# Patient Record
Sex: FEMALE | Race: WHITE | NOT HISPANIC OR LATINO | Employment: OTHER | URBAN - NONMETROPOLITAN AREA
[De-identification: names, ages, dates, MRNs, and addresses within clinical notes are randomized per-mention and may not be internally consistent; named-entity substitution may affect disease eponyms.]

---

## 2020-12-13 ENCOUNTER — HOSPITAL ENCOUNTER (OUTPATIENT)
Facility: HOSPITAL | Age: 60
Setting detail: OBSERVATION
Discharge: HOME | End: 2020-12-14
Attending: EMERGENCY MEDICINE | Admitting: INTERNAL MEDICINE

## 2020-12-13 ENCOUNTER — APPOINTMENT (OUTPATIENT)
Dept: CT IMAGING | Facility: HOSPITAL | Age: 60
End: 2020-12-13

## 2020-12-13 DIAGNOSIS — N39.0 ACUTE UTI: ICD-10-CM

## 2020-12-13 DIAGNOSIS — N20.1 LEFT URETERAL STONE: Primary | ICD-10-CM

## 2020-12-13 LAB
ALBUMIN SERPL-MCNC: 3.9 G/DL (ref 3.5–5.2)
ALBUMIN/GLOB SERPL: 1.1 G/DL
ALP SERPL-CCNC: 112 U/L (ref 39–117)
ALT SERPL W P-5'-P-CCNC: 23 U/L (ref 1–33)
ANION GAP SERPL CALCULATED.3IONS-SCNC: 11 MMOL/L (ref 5–15)
AST SERPL-CCNC: 27 U/L (ref 1–32)
BACTERIA UR QL AUTO: ABNORMAL /HPF
BASOPHILS # BLD AUTO: 0.09 10*3/MM3 (ref 0–0.2)
BASOPHILS NFR BLD AUTO: 0.8 % (ref 0–1.5)
BILIRUB SERPL-MCNC: 0.2 MG/DL (ref 0–1.2)
BILIRUB UR QL STRIP: NEGATIVE
BUN SERPL-MCNC: 9 MG/DL (ref 6–20)
BUN/CREAT SERPL: 10.1 (ref 7–25)
CALCIUM SPEC-SCNC: 9.1 MG/DL (ref 8.6–10.5)
CHLORIDE SERPL-SCNC: 107 MMOL/L (ref 98–107)
CLARITY UR: ABNORMAL
CO2 SERPL-SCNC: 22 MMOL/L (ref 22–29)
COLOR UR: YELLOW
CREAT SERPL-MCNC: 0.89 MG/DL (ref 0.57–1)
DEPRECATED RDW RBC AUTO: 42.1 FL (ref 37–54)
EOSINOPHIL # BLD AUTO: 0.27 10*3/MM3 (ref 0–0.4)
EOSINOPHIL NFR BLD AUTO: 2.3 % (ref 0.3–6.2)
ERYTHROCYTE [DISTWIDTH] IN BLOOD BY AUTOMATED COUNT: 13.6 % (ref 12.3–15.4)
GFR SERPL CREATININE-BSD FRML MDRD: 65 ML/MIN/1.73
GLOBULIN UR ELPH-MCNC: 3.4 GM/DL
GLUCOSE SERPL-MCNC: 128 MG/DL (ref 65–99)
GLUCOSE UR STRIP-MCNC: NEGATIVE MG/DL
HCT VFR BLD AUTO: 39.2 % (ref 34–46.6)
HGB BLD-MCNC: 13.4 G/DL (ref 12–15.9)
HGB UR QL STRIP.AUTO: ABNORMAL
HOLD SPECIMEN: NORMAL
HOLD SPECIMEN: NORMAL
HYALINE CASTS UR QL AUTO: ABNORMAL /LPF
IMM GRANULOCYTES # BLD AUTO: 0.08 10*3/MM3 (ref 0–0.05)
IMM GRANULOCYTES NFR BLD AUTO: 0.7 % (ref 0–0.5)
KETONES UR QL STRIP: NEGATIVE
LEUKOCYTE ESTERASE UR QL STRIP.AUTO: ABNORMAL
LIPASE SERPL-CCNC: 74 U/L (ref 13–60)
LYMPHOCYTES # BLD AUTO: 3.98 10*3/MM3 (ref 0.7–3.1)
LYMPHOCYTES NFR BLD AUTO: 33.5 % (ref 19.6–45.3)
MCH RBC QN AUTO: 28.9 PG (ref 26.6–33)
MCHC RBC AUTO-ENTMCNC: 34.2 G/DL (ref 31.5–35.7)
MCV RBC AUTO: 84.7 FL (ref 79–97)
MONOCYTES # BLD AUTO: 0.81 10*3/MM3 (ref 0.1–0.9)
MONOCYTES NFR BLD AUTO: 6.8 % (ref 5–12)
NEUTROPHILS NFR BLD AUTO: 55.9 % (ref 42.7–76)
NEUTROPHILS NFR BLD AUTO: 6.64 10*3/MM3 (ref 1.7–7)
NITRITE UR QL STRIP: NEGATIVE
NRBC BLD AUTO-RTO: 0 /100 WBC (ref 0–0.2)
PH UR STRIP.AUTO: 5.5 [PH] (ref 5–9)
PLATELET # BLD AUTO: 324 10*3/MM3 (ref 140–450)
PMV BLD AUTO: 10.5 FL (ref 6–12)
POTASSIUM SERPL-SCNC: 3.4 MMOL/L (ref 3.5–5.2)
PROT SERPL-MCNC: 7.3 G/DL (ref 6–8.5)
PROT UR QL STRIP: NEGATIVE
RBC # BLD AUTO: 4.63 10*6/MM3 (ref 3.77–5.28)
RBC # UR: ABNORMAL /HPF
REF LAB TEST METHOD: ABNORMAL
SODIUM SERPL-SCNC: 140 MMOL/L (ref 136–145)
SP GR UR STRIP: 1.02 (ref 1–1.03)
SQUAMOUS #/AREA URNS HPF: ABNORMAL /HPF
UROBILINOGEN UR QL STRIP: ABNORMAL
WBC # BLD AUTO: 11.87 10*3/MM3 (ref 3.4–10.8)
WBC UR QL AUTO: ABNORMAL /HPF
WHOLE BLOOD HOLD SPECIMEN: NORMAL
WHOLE BLOOD HOLD SPECIMEN: NORMAL

## 2020-12-13 PROCEDURE — 99284 EMERGENCY DEPT VISIT MOD MDM: CPT

## 2020-12-13 PROCEDURE — 25010000002 KETOROLAC TROMETHAMINE PER 15 MG: Performed by: EMERGENCY MEDICINE

## 2020-12-13 PROCEDURE — 96375 TX/PRO/DX INJ NEW DRUG ADDON: CPT

## 2020-12-13 PROCEDURE — 80053 COMPREHEN METABOLIC PANEL: CPT | Performed by: EMERGENCY MEDICINE

## 2020-12-13 PROCEDURE — 81001 URINALYSIS AUTO W/SCOPE: CPT | Performed by: EMERGENCY MEDICINE

## 2020-12-13 PROCEDURE — 85025 COMPLETE CBC W/AUTO DIFF WBC: CPT | Performed by: EMERGENCY MEDICINE

## 2020-12-13 PROCEDURE — 25010000002 ONDANSETRON PER 1 MG: Performed by: EMERGENCY MEDICINE

## 2020-12-13 PROCEDURE — 74176 CT ABD & PELVIS W/O CONTRAST: CPT

## 2020-12-13 PROCEDURE — 25010000002 LEVOFLOXACIN PER 250 MG: Performed by: EMERGENCY MEDICINE

## 2020-12-13 PROCEDURE — 96365 THER/PROPH/DIAG IV INF INIT: CPT

## 2020-12-13 PROCEDURE — 83690 ASSAY OF LIPASE: CPT | Performed by: EMERGENCY MEDICINE

## 2020-12-13 PROCEDURE — 99285 EMERGENCY DEPT VISIT HI MDM: CPT

## 2020-12-13 RX ORDER — ONDANSETRON 2 MG/ML
4 INJECTION INTRAMUSCULAR; INTRAVENOUS ONCE
Status: COMPLETED | OUTPATIENT
Start: 2020-12-13 | End: 2020-12-13

## 2020-12-13 RX ORDER — FAMOTIDINE 20 MG/1
20 TABLET, FILM COATED ORAL ONCE
Status: COMPLETED | OUTPATIENT
Start: 2020-12-13 | End: 2020-12-14

## 2020-12-13 RX ORDER — LEVOFLOXACIN 5 MG/ML
500 INJECTION, SOLUTION INTRAVENOUS ONCE
Status: COMPLETED | OUTPATIENT
Start: 2020-12-13 | End: 2020-12-14

## 2020-12-13 RX ORDER — FAMOTIDINE 20 MG/1
20 TABLET, FILM COATED ORAL NIGHTLY
COMMUNITY
End: 2022-05-05

## 2020-12-13 RX ORDER — SERTRALINE HYDROCHLORIDE 100 MG/1
100 TABLET, FILM COATED ORAL NIGHTLY
COMMUNITY
Start: 2020-11-04 | End: 2021-02-03

## 2020-12-13 RX ORDER — KETOROLAC TROMETHAMINE 15 MG/ML
15 INJECTION, SOLUTION INTRAMUSCULAR; INTRAVENOUS ONCE
Status: COMPLETED | OUTPATIENT
Start: 2020-12-13 | End: 2020-12-13

## 2020-12-13 RX ORDER — OMEPRAZOLE 40 MG/1
40 CAPSULE, DELAYED RELEASE ORAL DAILY
COMMUNITY

## 2020-12-13 RX ORDER — POTASSIUM CHLORIDE 750 MG/1
20 CAPSULE, EXTENDED RELEASE ORAL ONCE
Status: COMPLETED | OUTPATIENT
Start: 2020-12-13 | End: 2020-12-13

## 2020-12-13 RX ORDER — SODIUM CHLORIDE 0.9 % (FLUSH) 0.9 %
10 SYRINGE (ML) INJECTION AS NEEDED
Status: DISCONTINUED | OUTPATIENT
Start: 2020-12-13 | End: 2020-12-14 | Stop reason: HOSPADM

## 2020-12-13 RX ADMIN — ONDANSETRON 4 MG: 2 INJECTION INTRAMUSCULAR; INTRAVENOUS at 22:42

## 2020-12-13 RX ADMIN — SODIUM CHLORIDE 1000 ML: 9 INJECTION, SOLUTION INTRAVENOUS at 22:42

## 2020-12-13 RX ADMIN — LEVOFLOXACIN 500 MG: 5 INJECTION, SOLUTION INTRAVENOUS at 23:50

## 2020-12-13 RX ADMIN — POTASSIUM CHLORIDE 20 MEQ: 750 CAPSULE, EXTENDED RELEASE ORAL at 23:49

## 2020-12-13 RX ADMIN — KETOROLAC TROMETHAMINE 15 MG: 15 INJECTION, SOLUTION INTRAMUSCULAR; INTRAVENOUS at 22:42

## 2020-12-14 VITALS
DIASTOLIC BLOOD PRESSURE: 79 MMHG | HEIGHT: 61 IN | TEMPERATURE: 98.2 F | SYSTOLIC BLOOD PRESSURE: 168 MMHG | RESPIRATION RATE: 20 BRPM | WEIGHT: 155 LBS | HEART RATE: 71 BPM | BODY MASS INDEX: 29.27 KG/M2 | OXYGEN SATURATION: 98 %

## 2020-12-14 PROBLEM — A49.9 UTI (URINARY TRACT INFECTION), BACTERIAL: Status: ACTIVE | Noted: 2020-12-14

## 2020-12-14 PROBLEM — N39.0 UTI (URINARY TRACT INFECTION), BACTERIAL: Status: ACTIVE | Noted: 2020-12-14

## 2020-12-14 LAB
FLUAV RNA RESP QL NAA+PROBE: NOT DETECTED
FLUBV RNA RESP QL NAA+PROBE: NOT DETECTED
SARS-COV-2 RNA RESP QL NAA+PROBE: NOT DETECTED

## 2020-12-14 PROCEDURE — G0378 HOSPITAL OBSERVATION PER HR: HCPCS

## 2020-12-14 PROCEDURE — 82365 CALCULUS SPECTROSCOPY: CPT | Performed by: NURSE PRACTITIONER

## 2020-12-14 PROCEDURE — 25010000002 MORPHINE PER 10 MG: Performed by: HOSPITALIST

## 2020-12-14 PROCEDURE — 25010000002 ONDANSETRON PER 1 MG: Performed by: HOSPITALIST

## 2020-12-14 PROCEDURE — 96376 TX/PRO/DX INJ SAME DRUG ADON: CPT

## 2020-12-14 PROCEDURE — 96366 THER/PROPH/DIAG IV INF ADDON: CPT

## 2020-12-14 PROCEDURE — 96375 TX/PRO/DX INJ NEW DRUG ADDON: CPT

## 2020-12-14 PROCEDURE — 96361 HYDRATE IV INFUSION ADD-ON: CPT

## 2020-12-14 PROCEDURE — 87636 SARSCOV2 & INF A&B AMP PRB: CPT | Performed by: EMERGENCY MEDICINE

## 2020-12-14 PROCEDURE — 25010000002 KETOROLAC TROMETHAMINE PER 15 MG: Performed by: NURSE PRACTITIONER

## 2020-12-14 RX ORDER — POTASSIUM CHLORIDE 750 MG/1
40 CAPSULE, EXTENDED RELEASE ORAL AS NEEDED
Status: DISCONTINUED | OUTPATIENT
Start: 2020-12-14 | End: 2020-12-14 | Stop reason: HOSPADM

## 2020-12-14 RX ORDER — LEVOFLOXACIN 5 MG/ML
500 INJECTION, SOLUTION INTRAVENOUS EVERY 24 HOURS
Status: DISCONTINUED | OUTPATIENT
Start: 2020-12-15 | End: 2020-12-14 | Stop reason: HOSPADM

## 2020-12-14 RX ORDER — TAMSULOSIN HYDROCHLORIDE 0.4 MG/1
0.4 CAPSULE ORAL DAILY
Status: DISCONTINUED | OUTPATIENT
Start: 2020-12-14 | End: 2020-12-14 | Stop reason: HOSPADM

## 2020-12-14 RX ORDER — LANOLIN ALCOHOL/MO/W.PET/CERES
5.25 CREAM (GRAM) TOPICAL NIGHTLY PRN
Status: DISCONTINUED | OUTPATIENT
Start: 2020-12-14 | End: 2020-12-14 | Stop reason: HOSPADM

## 2020-12-14 RX ORDER — MORPHINE SULFATE 2 MG/ML
1 INJECTION, SOLUTION INTRAMUSCULAR; INTRAVENOUS EVERY 4 HOURS PRN
Status: DISCONTINUED | OUTPATIENT
Start: 2020-12-14 | End: 2020-12-14

## 2020-12-14 RX ORDER — KETOROLAC TROMETHAMINE 30 MG/ML
30 INJECTION, SOLUTION INTRAMUSCULAR; INTRAVENOUS ONCE
Status: COMPLETED | OUTPATIENT
Start: 2020-12-14 | End: 2020-12-14

## 2020-12-14 RX ORDER — SODIUM CHLORIDE 0.9 % (FLUSH) 0.9 %
10 SYRINGE (ML) INJECTION EVERY 12 HOURS SCHEDULED
Status: DISCONTINUED | OUTPATIENT
Start: 2020-12-14 | End: 2020-12-14 | Stop reason: HOSPADM

## 2020-12-14 RX ORDER — ONDANSETRON 2 MG/ML
4 INJECTION INTRAMUSCULAR; INTRAVENOUS EVERY 6 HOURS PRN
Status: DISCONTINUED | OUTPATIENT
Start: 2020-12-14 | End: 2020-12-14 | Stop reason: HOSPADM

## 2020-12-14 RX ORDER — POTASSIUM CHLORIDE 1.5 G/1.77G
40 POWDER, FOR SOLUTION ORAL AS NEEDED
Status: DISCONTINUED | OUTPATIENT
Start: 2020-12-14 | End: 2020-12-14 | Stop reason: HOSPADM

## 2020-12-14 RX ORDER — LEVOFLOXACIN 500 MG/1
500 TABLET, FILM COATED ORAL DAILY
Qty: 2 TABLET | Refills: 0 | Status: SHIPPED | OUTPATIENT
Start: 2020-12-14 | End: 2022-05-05

## 2020-12-14 RX ORDER — SODIUM CHLORIDE 9 MG/ML
150 INJECTION, SOLUTION INTRAVENOUS CONTINUOUS
Status: DISCONTINUED | OUTPATIENT
Start: 2020-12-14 | End: 2020-12-14 | Stop reason: HOSPADM

## 2020-12-14 RX ORDER — ONDANSETRON 4 MG/1
4 TABLET, FILM COATED ORAL EVERY 6 HOURS PRN
Status: DISCONTINUED | OUTPATIENT
Start: 2020-12-14 | End: 2020-12-14 | Stop reason: HOSPADM

## 2020-12-14 RX ORDER — SODIUM CHLORIDE 0.9 % (FLUSH) 0.9 %
10 SYRINGE (ML) INJECTION AS NEEDED
Status: DISCONTINUED | OUTPATIENT
Start: 2020-12-14 | End: 2020-12-14 | Stop reason: HOSPADM

## 2020-12-14 RX ORDER — BISACODYL 5 MG/1
5 TABLET, DELAYED RELEASE ORAL DAILY PRN
Status: DISCONTINUED | OUTPATIENT
Start: 2020-12-14 | End: 2020-12-14 | Stop reason: HOSPADM

## 2020-12-14 RX ORDER — NALOXONE HCL 0.4 MG/ML
0.4 VIAL (ML) INJECTION
Status: DISCONTINUED | OUTPATIENT
Start: 2020-12-14 | End: 2020-12-14 | Stop reason: HOSPADM

## 2020-12-14 RX ORDER — MORPHINE SULFATE 2 MG/ML
2 INJECTION, SOLUTION INTRAMUSCULAR; INTRAVENOUS EVERY 4 HOURS PRN
Status: DISCONTINUED | OUTPATIENT
Start: 2020-12-14 | End: 2020-12-14 | Stop reason: HOSPADM

## 2020-12-14 RX ORDER — POTASSIUM CHLORIDE 7.45 MG/ML
10 INJECTION INTRAVENOUS
Status: DISCONTINUED | OUTPATIENT
Start: 2020-12-14 | End: 2020-12-14 | Stop reason: HOSPADM

## 2020-12-14 RX ORDER — NALOXONE HCL 0.4 MG/ML
0.4 VIAL (ML) INJECTION
Status: DISCONTINUED | OUTPATIENT
Start: 2020-12-14 | End: 2020-12-14

## 2020-12-14 RX ADMIN — SERTRALINE HYDROCHLORIDE 100 MG: 50 TABLET ORAL at 00:09

## 2020-12-14 RX ADMIN — TAMSULOSIN HYDROCHLORIDE 0.4 MG: 0.4 CAPSULE ORAL at 08:53

## 2020-12-14 RX ADMIN — KETOROLAC TROMETHAMINE 30 MG: 30 INJECTION, SOLUTION INTRAMUSCULAR; INTRAVENOUS at 08:53

## 2020-12-14 RX ADMIN — ONDANSETRON HYDROCHLORIDE 4 MG: 2 INJECTION, SOLUTION INTRAMUSCULAR; INTRAVENOUS at 06:53

## 2020-12-14 RX ADMIN — SODIUM CHLORIDE, PRESERVATIVE FREE 10 ML: 5 INJECTION INTRAVENOUS at 03:41

## 2020-12-14 RX ADMIN — FAMOTIDINE 20 MG: 20 TABLET ORAL at 00:08

## 2020-12-14 RX ADMIN — SODIUM CHLORIDE 150 ML/HR: 900 INJECTION, SOLUTION INTRAVENOUS at 04:18

## 2020-12-14 RX ADMIN — MORPHINE SULFATE 1 MG: 2 INJECTION, SOLUTION INTRAMUSCULAR; INTRAVENOUS at 06:04

## 2020-12-14 RX ADMIN — SODIUM CHLORIDE, PRESERVATIVE FREE 10 ML: 5 INJECTION INTRAVENOUS at 08:06

## 2020-12-14 NOTE — ED PROVIDER NOTES
Subjective   60-year-old white female presents emerged part chief complaint of flank pain.  Patient complains of left flank pain radiating to the left lower quadrant that started this evening.  Patient describes the pain as a sharp throbbing pain.  Patient rates the pain as 10 out of 10 severity.  Associated symptoms include nausea and vomiting.  Patient denies fever, sweats, chills, chest pain, shortness of breath, dysuria, or hematuria.          Review of Systems   Constitutional: Negative for chills, diaphoresis and fever.   Respiratory: Negative for cough and shortness of breath.    Cardiovascular: Negative for chest pain and leg swelling.   Gastrointestinal: Positive for abdominal pain, nausea and vomiting. Negative for blood in stool.   Genitourinary: Positive for flank pain. Negative for dysuria and hematuria.   Musculoskeletal: Positive for back pain. Negative for gait problem and neck pain.   Neurological: Negative for syncope, weakness and headaches.   All other systems reviewed and are negative.      History reviewed. No pertinent past medical history.    Allergies   Allergen Reactions   • Keflex [Cephalexin] Diarrhea       History reviewed. No pertinent surgical history.    No family history on file.    Social History     Socioeconomic History   • Marital status:      Spouse name: Not on file   • Number of children: Not on file   • Years of education: Not on file   • Highest education level: Not on file           Objective   Physical Exam  Vitals signs and nursing note reviewed.   Constitutional:       General: She is not in acute distress.     Appearance: She is not toxic-appearing or diaphoretic.   HENT:      Head: Normocephalic and atraumatic.      Right Ear: External ear normal.      Left Ear: External ear normal.      Nose: Nose normal.      Mouth/Throat:      Mouth: Mucous membranes are moist.      Pharynx: Oropharynx is clear.   Eyes:      Extraocular Movements: Extraocular movements  intact.      Conjunctiva/sclera: Conjunctivae normal.      Pupils: Pupils are equal, round, and reactive to light.   Neck:      Musculoskeletal: Normal range of motion and neck supple.   Cardiovascular:      Rate and Rhythm: Normal rate and regular rhythm.      Pulses: Normal pulses.      Heart sounds: Normal heart sounds.   Pulmonary:      Effort: Pulmonary effort is normal.      Breath sounds: Normal breath sounds.   Abdominal:      General: Bowel sounds are normal. There is no distension.      Palpations: Abdomen is soft. There is no mass.      Tenderness: There is no abdominal tenderness. There is no guarding.   Musculoskeletal: Normal range of motion.      Right lower leg: No edema.      Left lower leg: No edema.   Skin:     General: Skin is warm and dry.   Neurological:      General: No focal deficit present.      Mental Status: She is alert and oriented to person, place, and time.   Psychiatric:         Mood and Affect: Mood normal.         Behavior: Behavior normal.         Procedures           ED Course  ED Course as of Dec 14 0055   Sun Dec 13, 2020   2340 Patient has a left ureteral stone with hydronephrosis and UTI.  She will need to be admitted for IV antibiotics and urology consultation in the morning.  Patient is alert and resting comfortably.  I reviewed the results of her evaluation with her and my recommendation for admission.    [DR]   2352 Case discussed with the hospitalist Dr. Castillo.  She agrees to admit the patient to the medical floor.    [DR]      ED Course User Index  [DR] Freddy Acosta MD            Labs Reviewed   COMPREHENSIVE METABOLIC PANEL - Abnormal; Notable for the following components:       Result Value    Glucose 128 (*)     Potassium 3.4 (*)     All other components within normal limits    Narrative:     GFR Normal >60  Chronic Kidney Disease <60  Kidney Failure <15     LIPASE - Abnormal; Notable for the following components:    Lipase 74 (*)     All other components within  normal limits   URINALYSIS W/ MICROSCOPIC IF INDICATED (NO CULTURE) - Abnormal; Notable for the following components:    Appearance, UA Cloudy (*)     Blood, UA Large (3+) (*)     Leuk Esterase, UA Moderate (2+) (*)     All other components within normal limits   CBC WITH AUTO DIFFERENTIAL - Abnormal; Notable for the following components:    WBC 11.87 (*)     Immature Grans % 0.7 (*)     Lymphocytes, Absolute 3.98 (*)     Immature Grans, Absolute 0.08 (*)     All other components within normal limits   URINALYSIS, MICROSCOPIC ONLY - Abnormal; Notable for the following components:    RBC, UA Too Numerous to Count (*)     WBC, UA 13-20 (*)     All other components within normal limits   COVID-19 AND FLU A/B, NP SWAB IN TRANSPORT MEDIA 8-12 HR TAT   RAINBOW DRAW    Narrative:     The following orders were created for panel order [place] Draw.  Procedure                               Abnormality         Status                     ---------                               -----------         ------                     Light Blue Top[[id number]]                                    Final result               Green Top (Gel)[[id number]]                                   Final result               Lavender Top[[id number]]                                      Final result               Gold Top - SST[[id number]]                                    Final result                 Please view results for these tests on the individual orders.   LIGHT BLUE TOP   GREEN TOP   LAVENDER TOP   GOLD TOP - SST   CBC AND DIFFERENTIAL    Narrative:     The following orders were created for panel order CBC & Differential.  Procedure                               Abnormality         Status                     ---------                               -----------         ------                     CBC Auto Differential[[id number]]         Abnormal            Final result                 Please view results for these tests on the individual orders.     Ct  Abdomen Pelvis Without Contrast    Result Date: 12/13/2020  Narrative: EXAM DESCRIPTION: CT ABDOMEN PELVIS WO CONTRAST RadLex: CT ABDOMEN PELVIS WITHOUT IV CONTRAST CLINICAL HISTORY: 60 years  Female;  left flank pain TECHNOLOGIST NOTES: TECHNIQUE: Helical CT imaging was obtained of the abdomen and pelvis with multiplanar reconstructions. This exam was performed according to our departmental dose-optimization program, which includes automated exposure control, adjustment of the mA and/or kV according to patient size and/or use of iterative reconstruction techniques. COMPARISON: None currently available FINDINGS: Abdomen: The visualized lung bases are unremarkable. There is no focal consolidation.  There is a small to moderate hiatal hernia.  The liver, spleen, pancreas, adrenal glands and kidneys show no acute abnormality. No evidence of acute pancreatitis.    There are no calcified gallstones. There is no gallbladder wall thickening. No pericholecystic fluid.  There is no gross biliary duct dilatation.  There is mild left hydronephrosis. There are no renal stones.   No free air.    There is no significant free fluid.    There is no significant lymph node enlargement. There is no significant aneurysmal enlargement of the abdominal aorta. Pelvis: There is no evidence of bowel obstruction.    No herniated bowel loops.  . No focal inflammatory changes . The appendix is unremarkable.  Evaluation of the bowel is limited when there is no oral contrast or when the bowel is incompletely opacified with enteric contrast.. There is no significant free fluid in the pelvis. The bladder is grossly unremarkable.  There is a left hip arthroplasty. There is associated artifact secondary to the hardware. There may be a 3 mm stone at the left UVJ but evaluation is very limited. Uterus appears to be absent.     Impression: 1.  Mild left hydronephrosis and hydroureter. Possible 3 mm stone at the left UVJ but evaluation is limited due to  artifact from the patient's left hip arthroplasty. 2.  Small to moderate hiatal hernia. Electronically signed by:  Floyd Gomes MD  12/13/2020 11:27 PM CST Workstation: [id number]                                    [facility]    Final diagnoses:   Left ureteral stone   Acute UTI            Freddy Acosta MD  12/14/20 0055

## 2020-12-14 NOTE — H&P
.        [facility] Medicine Admission      Date of Admission: 12/13/2020  Patient seen and evaluated on 12/13/2020 although note is written after midnight    Primary Care Physician: Jt Yuan MD      Chief Complaint: *Flank pain*    HPI:*  *Patient is a 60-year-old female who presents to the [facility] emergency room with complaints of flank pain.  She states that the pain started early evening the day of admission.  It was on the left side and radiated into her groin area.  She describes the pain as a sharp stabbing type pain.  When it happened it was a 10 out of 10 and continued on into the ER.  For which she was given pain medicine which did help.  She reports nausea and vomiting denies any diarrhea fever chills sick contacts headache blurry vision or chest pain.  He does not have a history of kidney stones.  Work-up in the emergency room showed a left 3 mm stone with hydronephrosis and labs consistent with urinary tract infection.    Concurrent Medical History:  has no past medical history on file.  Depression  Past Surgical History:  has no past surgical history on file.  None  Family History: family history is not on file. **Hypertension*    Social History:  Does not smoke concentric alcohol does not use any illicit drugs    Allergies:   Allergies   Allergen Reactions   • Keflex [Cephalexin] Diarrhea       Medications:   Prior to Admission medications    Medication Sig Start Date End Date Taking? Authorizing Provider   famotidine (PEPCID) 20 MG tablet Take 20 mg by mouth Every Night.   Yes Jae Stevenson MD   omeprazole (priLOSEC) 40 MG capsule Take 40 mg by mouth Daily.   Yes Jae Stevenson MD   sertraline (ZOLOFT) 100 MG tablet Take 100 mg by mouth Every Night. 11/4/20 2/3/21 Yes Jae Stevenson MD       Review of Systems:  Review of Systems   12 point review of systems obtained pertinent positives and negatives noted above in  HPI otherwise complete ROS is negative except as mentioned above.    Physical Exam:   Temp:  [97.6 °F (36.4 °C)] 97.6 °F (36.4 °C)  Heart Rate:  [77-88] 88  Resp:  [18] 18  BP: (138-153)/(73) 138/73  Physical Exam  Vitals signs and nursing note reviewed.   Constitutional:       General: She is not in acute distress.     Appearance: She is ill-appearing. She is not toxic-appearing or diaphoretic.   HENT:      Head: Normocephalic and atraumatic.      Nose: Nose normal.      Mouth/Throat:      Pharynx: Oropharynx is clear.   Eyes:      Extraocular Movements: Extraocular movements intact.      Conjunctiva/sclera: Conjunctivae normal.   Neck:      Musculoskeletal: Neck supple.   Cardiovascular:      Rate and Rhythm: Normal rate and regular rhythm.      Pulses: Normal pulses.      Heart sounds: Normal heart sounds.   Pulmonary:      Effort: Pulmonary effort is normal.      Breath sounds: Normal breath sounds.   Abdominal:      General: Bowel sounds are normal.      Palpations: Abdomen is soft.      Tenderness: There is abdominal tenderness. There is left CVA tenderness. There is no guarding or rebound.   Musculoskeletal:         General: No swelling or deformity.   Skin:     General: Skin is warm and dry.      Capillary Refill: Capillary refill takes less than 2 seconds.   Neurological:      General: No focal deficit present.      Mental Status: She is alert and oriented to person, place, and time.      Cranial Nerves: No cranial nerve deficit.   Psychiatric:         Mood and Affect: Mood normal.         Behavior: Behavior normal.           Results Reviewed:  I have personally reviewed current lab, radiology, and data and agree with results.  Lab Results (last 24 hours)     Procedure Component Value Units Date/Time    COVID-19 and FLU A/B PCR - Swab, Nasopharynx [[id number]] Collected: 12/14/20 0001    Specimen: Swab from Nasopharynx Updated: 12/14/20 0010    [place] Draw [[id number]] Collected: 12/13/20 2214    Specimen:  Blood Updated: 12/13/20 2316    Narrative:      The following orders were created for panel order [place] Draw.  Procedure                               Abnormality         Status                     ---------                               -----------         ------                     Light Blue Top[[id number]]                                    Final result               Green Top (Gel)[[id number]]                                   Final result               Lavender Top[[id number]]                                      Final result               Gold Top - SST[[id number]]                                    Final result                 Please view results for these tests on the individual orders.    Light Blue Top [[id number]] Collected: 12/13/20 2214    Specimen: Blood Updated: 12/13/20 2316     Extra Tube hold for add-on     Comment: Auto resulted       Green Top (Gel) [[id number]] Collected: 12/13/20 2214    Specimen: Blood Updated: 12/13/20 2316     Extra Tube Hold for add-ons.     Comment: Auto resulted.       Lavender Top [[id number]] Collected: 12/13/20 2214    Specimen: Blood Updated: 12/13/20 2316     Extra Tube hold for add-on     Comment: Auto resulted       Gold Top - SST [[id number]] Collected: 12/13/20 2214    Specimen: Blood Updated: 12/13/20 2316     Extra Tube Hold for add-ons.     Comment: Auto resulted.       Urinalysis With Microscopic If Indicated (No Culture) - Urine, Clean Catch [[id number]]  (Abnormal) Collected: 12/13/20 2304    Specimen: Urine, Clean Catch Updated: 12/13/20 2315     Color, UA Yellow     Appearance, UA Cloudy     pH, UA 5.5     Specific Gravity, UA 1.022     Glucose, UA Negative     Ketones, UA Negative     Bilirubin, UA Negative     Blood, UA Large (3+)     Protein, UA Negative     Leuk Esterase, UA Moderate (2+)     Nitrite, UA Negative     Urobilinogen, UA 0.2 E.U./dL    Urinalysis, Microscopic Only - Urine, Clean Catch [[id number]]  (Abnormal) Collected: 12/13/20 2304    Specimen: Urine,  Clean Catch Updated: 12/13/20 2315     RBC, UA Too Numerous to Count /HPF      WBC, UA 13-20 /HPF      Bacteria, UA None Seen /HPF      Squamous Epithelial Cells, UA None Seen /HPF      Hyaline Casts, UA 3-6 /LPF      Methodology Automated Microscopy    CBC & Differential [[id number]]  (Abnormal) Collected: 12/13/20 2214    Specimen: Blood Updated: 12/13/20 2249    Narrative:      The following orders were created for panel order CBC & Differential.  Procedure                               Abnormality         Status                     ---------                               -----------         ------                     CBC Auto Differential[[id number]]         Abnormal            Final result                 Please view results for these tests on the individual orders.    CBC Auto Differential [[id number]]  (Abnormal) Collected: 12/13/20 2214    Specimen: Blood Updated: 12/13/20 2249     WBC 11.87 10*3/mm3      RBC 4.63 10*6/mm3      Hemoglobin 13.4 g/dL      Hematocrit 39.2 %      MCV 84.7 fL      MCH 28.9 pg      MCHC 34.2 g/dL      RDW 13.6 %      RDW-SD 42.1 fl      MPV 10.5 fL      Platelets 324 10*3/mm3      Neutrophil % 55.9 %      Lymphocyte % 33.5 %      Monocyte % 6.8 %      Eosinophil % 2.3 %      Basophil % 0.8 %      Immature Grans % 0.7 %      Neutrophils, Absolute 6.64 10*3/mm3      Lymphocytes, Absolute 3.98 10*3/mm3      Monocytes, Absolute 0.81 10*3/mm3      Eosinophils, Absolute 0.27 10*3/mm3      Basophils, Absolute 0.09 10*3/mm3      Immature Grans, Absolute 0.08 10*3/mm3      nRBC 0.0 /100 WBC     Comprehensive Metabolic Panel [[id number]]  (Abnormal) Collected: 12/13/20 2214    Specimen: Blood Updated: 12/13/20 2243     Glucose 128 mg/dL      BUN 9 mg/dL      Creatinine 0.89 mg/dL      Sodium 140 mmol/L      Potassium 3.4 mmol/L      Chloride 107 mmol/L      CO2 22.0 mmol/L      Calcium 9.1 mg/dL      Total Protein 7.3 g/dL      Albumin 3.90 g/dL      ALT (SGPT) 23 U/L      AST (SGOT) 27 U/L       Alkaline Phosphatase 112 U/L      Total Bilirubin 0.2 mg/dL      eGFR Non African Amer 65 mL/min/1.73      Globulin 3.4 gm/dL      A/G Ratio 1.1 g/dL      BUN/Creatinine Ratio 10.1     Anion Gap 11.0 mmol/L     Narrative:      GFR Normal >60  Chronic Kidney Disease <60  Kidney Failure <15      Lipase [[id number]]  (Abnormal) Collected: 12/13/20 2214    Specimen: Blood Updated: 12/13/20 2243     Lipase 74 U/L         Imaging Results (Last 24 Hours)     Procedure Component Value Units Date/Time    CT Abdomen Pelvis Without Contrast [[id number]] Collected: 12/13/20 2250     Updated: 12/13/20 2328    Narrative:      EXAM DESCRIPTION:  CT ABDOMEN PELVIS WO CONTRAST  RadLex: CT ABDOMEN PELVIS WITHOUT IV CONTRAST     CLINICAL HISTORY:   60 years  Female;  left flank pain    TECHNOLOGIST NOTES:    TECHNIQUE: Helical CT imaging was obtained of the abdomen and  pelvis with multiplanar reconstructions. This exam was performed  according to our departmental dose-optimization program, which  includes automated exposure control, adjustment of the mA and/or  kV according to patient size and/or use of iterative  reconstruction techniques.     COMPARISON: None currently available    FINDINGS:   Abdomen:  The visualized lung bases are unremarkable. There is no focal  consolidation.  There is a small to moderate hiatal hernia.  The  liver, spleen, pancreas, adrenal glands and kidneys show no acute  abnormality. No evidence of acute pancreatitis.    There are no  calcified gallstones. There is no gallbladder wall thickening. No  pericholecystic fluid.  There is no gross biliary duct  dilatation.  There is mild left hydronephrosis. There are no  renal stones.      No free air.    There is no significant free fluid.    There is  no significant lymph node enlargement. There is no significant  aneurysmal enlargement of the abdominal aorta.    Pelvis:  There is no evidence of bowel obstruction.    No herniated bowel  loops.  . No focal  inflammatory changes . The appendix is  unremarkable.  Evaluation of the bowel is limited when there is  no oral contrast or when the bowel is incompletely opacified with  enteric contrast.. There is no significant free fluid in the  pelvis. The bladder is grossly unremarkable.  There is a left hip  arthroplasty. There is associated artifact secondary to the  hardware. There may be a 3 mm stone at the left UVJ but  evaluation is very limited. Uterus appears to be absent.          Impression:      1.  Mild left hydronephrosis and hydroureter. Possible 3 mm stone  at the left UVJ but evaluation is limited due to artifact from  the patient's left hip arthroplasty.  2.  Small to moderate hiatal hernia.    Electronically signed by:  Floyd Gomes MD  12/13/2020 11:27 PM CST  Workstation: [id number]            Assessment:    Active Hospital Problems    Diagnosis   • Left ureteral stone             Plan:    Nephrolithiasis with hydronephrosis-millimeter kidney stone with hydronephrosis on the left causing severe pain with urinary tract infection.  Patient has been started on Levaquin been given fluids will continue to fluids and attempt to possibly passed stone.  Will place on Dr. Fellow's list and will contact him in the a.m. if the stone has not passed.  N.p.o. after midnight.     Depression to new current medications    Hypokalemia will place on electrolyte protocol    DVT prophylaxis we will use SCDs as patient may be going to surgery    Preop Covid testing done in preparation for possible surgery in the a.m. if the stone does not pass    Full code    I discussed the patient's findings and my recommendations with: Patient and  at the bedside    Kenisha Veronica MD

## 2020-12-14 NOTE — DISCHARGE SUMMARY
[facility]  DISCHARGE SUMMARY       Date of Admission: 12/13/2020  Date of Discharge:  12/14/2020  Primary Care Physician: Jt Yuan MD    Presenting Problem/History of Present Illness:  Left ureteral stone [N20.1]       Final Discharge Diagnoses:    Left ureteral stone    UTI (urinary tract infection), bacterial      Consults:   Consults     Date and Time Order Name Status Description    12/14/2020 0102 Inpatient Urology Consult          Pertinent Test Results:   Ct Abdomen Pelvis Without Contrast    Result Date: 12/13/2020  EXAM DESCRIPTION: CT ABDOMEN PELVIS WO CONTRAST RadLex: CT ABDOMEN PELVIS WITHOUT IV CONTRAST CLINICAL HISTORY: 60 years  Female;  left flank pain TECHNOLOGIST NOTES: TECHNIQUE: Helical CT imaging was obtained of the abdomen and pelvis with multiplanar reconstructions. This exam was performed according to our departmental dose-optimization program, which includes automated exposure control, adjustment of the mA and/or kV according to patient size and/or use of iterative reconstruction techniques. COMPARISON: None currently available FINDINGS: Abdomen: The visualized lung bases are unremarkable. There is no focal consolidation.  There is a small to moderate hiatal hernia.  The liver, spleen, pancreas, adrenal glands and kidneys show no acute abnormality. No evidence of acute pancreatitis.    There are no calcified gallstones. There is no gallbladder wall thickening. No pericholecystic fluid.  There is no gross biliary duct dilatation.  There is mild left hydronephrosis. There are no renal stones.   No free air.    There is no significant free fluid.    There is no significant lymph node enlargement. There is no significant aneurysmal enlargement of the abdominal aorta. Pelvis: There is no evidence of bowel obstruction.    No herniated bowel loops.  . No focal inflammatory changes . The appendix is unremarkable.  Evaluation of the  "bowel is limited when there is no oral contrast or when the bowel is incompletely opacified with enteric contrast.. There is no significant free fluid in the pelvis. The bladder is grossly unremarkable.  There is a left hip arthroplasty. There is associated artifact secondary to the hardware. There may be a 3 mm stone at the left UVJ but evaluation is very limited. Uterus appears to be absent.     1.  Mild left hydronephrosis and hydroureter. Possible 3 mm stone at the left UVJ but evaluation is limited due to artifact from the patient's left hip arthroplasty. 2.  Small to moderate hiatal hernia. Electronically signed by:  Floyd Gomes MD  12/13/2020 11:27 PM CST Workstation: [id number]      HPI/Hospital Course:  The patient is a 60 y.o. female with a history of GERD who presented to [facility] with left flank pain and nausea.  She was found to have a 3 mm left UVJ stone.  She was managed conservatively with IV fluids, pain control, and antiemetics. Urine was strained and she passed her stone. It will be sent for stone analysis. UA is consistent with UTI and she will complete her course of PO levaquin.  She is stable and discharged to home.     Condition on Discharge:  Stable    Physical Exam on Discharge:  /79 (BP Location: Left arm, Patient Position: Sitting)   Pulse 71   Temp 98.2 °F (36.8 °C)   Resp 20   Ht 154.9 cm (61\")   Wt 70.3 kg (155 lb)   SpO2 98%   BMI 29.29 kg/m²   Physical Exam  Vitals signs reviewed.   Constitutional:       Appearance: Normal appearance.   HENT:      Head: Normocephalic and atraumatic.   Cardiovascular:      Rate and Rhythm: Normal rate and regular rhythm.   Pulmonary:      Effort: Pulmonary effort is normal. No respiratory distress.      Breath sounds: Normal breath sounds.   Abdominal:      General: Bowel sounds are normal. There is no distension.      Palpations: Abdomen is soft.      Tenderness: There is no abdominal tenderness.   Musculoskeletal:         " General: No swelling or deformity.   Skin:     General: Skin is warm and dry.   Neurological:      General: No focal deficit present.      Mental Status: She is alert and oriented to person, place, and time.           Discharge Disposition:  Home or Self Care    Discharge Medications:     Discharge Medications      New Medications      Instructions Start Date   levoFLOXacin 500 MG tablet  Commonly known as: Levaquin   500 mg, Oral, Daily         Continue These Medications      Instructions Start Date   famotidine 20 MG tablet  Commonly known as: PEPCID   20 mg, Oral, Nightly      omeprazole 40 MG capsule  Commonly known as: priLOSEC   40 mg, Oral, Daily      sertraline 100 MG tablet  Commonly known as: ZOLOFT   100 mg, Oral, Nightly             Discharge Diet:   Diet Instructions     Diet: Regular; Thin      Discharge Diet: Regular    Fluid Consistency: Thin          Activity at Discharge:   Activity Instructions     Activity as Tolerated            Follow-up Appointments:   1. PCP 1 week    Test Results Pending at Discharge:   Pending Labs     Order Current Status    CBC Auto Differential Collected (12/14/20 0546)    Comprehensive Metabolic Panel Collected (12/14/20 0546)    Magnesium Collected (12/14/20 0546)    Phosphorus Collected (12/14/20 0546)    Protime-INR Collected (12/14/20 0546)    aPTT Collected (12/14/20 0546)          ONESIMO aLwton  12/14/20  10:37 CST

## 2020-12-22 LAB
COLOR STONE: NORMAL
COM MFR STONE: 100 %
COMPN STONE: NORMAL
LABORATORY COMMENT REPORT: NORMAL
Lab: NORMAL
Lab: NORMAL
PHOTO: NORMAL
SIZE STONE: NORMAL MM
SPEC SOURCE SUBJ: NORMAL
WT STONE: 9 MG

## 2022-05-05 ENCOUNTER — OFFICE VISIT (OUTPATIENT)
Dept: GASTROENTEROLOGY | Facility: CLINIC | Age: 62
End: 2022-05-05

## 2022-05-05 VITALS
WEIGHT: 172 LBS | HEART RATE: 107 BPM | HEIGHT: 61 IN | DIASTOLIC BLOOD PRESSURE: 81 MMHG | BODY MASS INDEX: 32.47 KG/M2 | SYSTOLIC BLOOD PRESSURE: 128 MMHG

## 2022-05-05 DIAGNOSIS — K21.9 GASTROESOPHAGEAL REFLUX DISEASE, UNSPECIFIED WHETHER ESOPHAGITIS PRESENT: Primary | ICD-10-CM

## 2022-05-05 PROCEDURE — 99204 OFFICE O/P NEW MOD 45 MIN: CPT | Performed by: INTERNAL MEDICINE

## 2022-05-05 RX ORDER — VIT C/B6/B5/MAGNESIUM/HERB 173 50-5-6-5MG
1 CAPSULE ORAL DAILY
COMMUNITY

## 2022-05-05 RX ORDER — UBIDECARENONE 75 MG
1 CAPSULE ORAL DAILY
COMMUNITY
End: 2022-05-05 | Stop reason: DRUGHIGH

## 2022-05-05 RX ORDER — ALBUTEROL SULFATE 90 UG/1
2 AEROSOL, METERED RESPIRATORY (INHALATION) EVERY 4 HOURS PRN
COMMUNITY
Start: 2022-01-04

## 2022-05-05 RX ORDER — ZINC GLUCONATE 50 MG
50 TABLET ORAL DAILY
COMMUNITY

## 2022-05-05 RX ORDER — DEXTROSE AND SODIUM CHLORIDE 5; .45 G/100ML; G/100ML
30 INJECTION, SOLUTION INTRAVENOUS CONTINUOUS PRN
Status: CANCELLED | OUTPATIENT
Start: 2022-06-03

## 2022-05-05 RX ORDER — ONDANSETRON 4 MG/1
4 TABLET, ORALLY DISINTEGRATING ORAL EVERY 8 HOURS PRN
COMMUNITY
Start: 2022-02-02

## 2022-05-05 RX ORDER — ROSUVASTATIN CALCIUM 5 MG/1
5 TABLET, COATED ORAL NIGHTLY
COMMUNITY
Start: 2022-01-03

## 2022-05-05 RX ORDER — DEXTROMETHORPHAN HYDROBROMIDE AND PROMETHAZINE HYDROCHLORIDE 15; 6.25 MG/5ML; MG/5ML
5 SYRUP ORAL 4 TIMES DAILY PRN
COMMUNITY
Start: 2022-02-02

## 2022-05-05 RX ORDER — ALBUTEROL SULFATE 2.5 MG/3ML
2.5 SOLUTION RESPIRATORY (INHALATION) EVERY 4 HOURS PRN
COMMUNITY
Start: 2021-12-30

## 2022-05-05 NOTE — PROGRESS NOTES
[facility]      Chief Complaint:   Chief Complaint   Patient presents with   • Gastroesophageal Reflux Disease       Subjective     HPI:   Patient with severe GERD.  Patient states that she has to sleep in a chair sitting up otherwise she is choking when her esophageal reflux.  Patient has gone to [facility] but has been scheduled for EGD and colonoscopy but has not undergone these.  Patient states she had a Cologuard done last year which was negative.    Plan; schedule patient for EGD for evaluation of the cause of patient's severe reflux.  We will have patient follow-up following this procedure.  Patient does not need a colonoscopy at this time.    Past Medical History:   Past Medical History:   Diagnosis Date   • Smoking hx        Past Surgical History:  History reviewed. No pertinent surgical history.    Family History:  History reviewed. No pertinent family history.    Social History:   reports that she has never smoked. She has never used smokeless tobacco. She reports that she does not drink alcohol and does not use drugs.    Medications:   Prior to Admission medications    Medication Sig Start Date End Date Taking? Authorizing Provider   albuterol (PROVENTIL) (2.5 MG/3ML) 0.083% nebulizer solution Inhale 2.5 mg Every 4 (Four) Hours As Needed. 12/30/21  Yes Jae Stevenson MD   albuterol sulfate  (90 Base) MCG/ACT inhaler Inhale 2 puffs Every 4 (Four) Hours As Needed. 1/4/22  Yes Jae Stevenson MD   ascorbic acid (VITAMIN C) 1000 MG tablet Take 1,000 mg by mouth Daily.   Yes Jae Stevenson MD   Cholecalciferol (VITAMIN D3 PO) Take 1 tablet by mouth Daily.   Yes Jae Stevenson MD   cyanocobalamin (VITAMIN B-12) 1000 MCG tablet Take 1,000 mcg by mouth Daily.   Yes Jae Stevenson MD   omeprazole (priLOSEC) 40 MG capsule Take 40 mg by mouth Daily.   Yes Jae Stevenson MD   ondansetron ODT (ZOFRAN-ODT) 4 MG disintegrating tablet Take 4 mg  "by mouth Every 8 (Eight) Hours As Needed for Nausea or Vomiting. 2/2/22  Yes Jae Stevenson MD   PHYTOSTEROLS PO Take 4 capsules by mouth Daily.   Yes Jae Stevenson MD   promethazine-dextromethorphan (PROMETHAZINE-DM) 6.25-15 MG/5ML syrup Take 5 mL by mouth 4 (Four) Times a Day As Needed for Cough. 2/2/22  Yes Jae Stevenson MD   rosuvastatin (CRESTOR) 5 MG tablet Take 5 mg by mouth Every Night. 1/3/22  Yes Jae Stevenson MD   Zinc 50 MG tablet Take 50 mg by mouth Daily.   Yes Jae Stevenson MD   sertraline (ZOLOFT) 100 MG tablet Take 100 mg by mouth Every Night. 11/4/20 2/3/21  Jae Stevenson MD   famotidine (PEPCID) 20 MG tablet Take 20 mg by mouth Every Night.  5/5/22  Jae Stevenson MD   levoFLOXacin (Levaquin) 500 MG tablet Take 1 tablet by mouth Daily. 12/14/20 5/5/22  Omkar Kc APRN   vitamin B-12 (CYANOCOBALAMIN) 100 MCG tablet Take 1 tablet by mouth Daily.  5/5/22  Jae Stevenson MD       Allergies:  Keflex [cephalexin]    ROS:    Review of Systems   Constitutional: Negative for activity change, appetite change, chills, diaphoresis, fatigue, fever and unexpected weight change.   HENT: Negative for sore throat and trouble swallowing.    Respiratory: Negative for shortness of breath.    Gastrointestinal: Negative for abdominal distention, abdominal pain, anal bleeding, blood in stool, constipation, diarrhea, nausea, rectal pain and vomiting.   Endocrine: Negative for polydipsia, polyphagia and polyuria.   Genitourinary: Negative for difficulty urinating.   Musculoskeletal: Negative for arthralgias.   Skin: Negative for pallor.   Allergic/Immunologic: Negative for food allergies.   Neurological: Negative for weakness and light-headedness.   Psychiatric/Behavioral: Negative for behavioral problems.     Objective     Blood pressure 128/81, pulse 107, height 154.9 cm (61\"), weight 78 kg (172 lb).    Physical Exam  Constitutional:       " General: She is not in acute distress.     Appearance: She is well-developed. She is not diaphoretic.   HENT:      Head: Normocephalic and atraumatic.   Cardiovascular:      Rate and Rhythm: Normal rate and regular rhythm.      Heart sounds: Normal heart sounds. No murmur heard.    No friction rub. No gallop.   Pulmonary:      Effort: No respiratory distress.      Breath sounds: Normal breath sounds. No wheezing or rales.   Chest:      Chest wall: No tenderness.   Abdominal:      General: Bowel sounds are normal. There is no distension.      Palpations: Abdomen is soft. There is no mass.      Tenderness: There is no abdominal tenderness. There is no guarding or rebound.      Hernia: No hernia is present.   Musculoskeletal:         General: Normal range of motion.   Skin:     General: Skin is warm and dry.      Coloration: Skin is not pale.      Findings: No erythema or rash.   Neurological:      Mental Status: She is alert and oriented to person, place, and time.   Psychiatric:         Behavior: Behavior normal.         Thought Content: Thought content normal.         Judgment: Judgment normal.          Assessment/Plan   Diagnoses and all orders for this visit:    1. Gastroesophageal reflux disease, unspecified whether esophagitis present (Primary)  -     Case Request; Standing  -     Case Request    Other orders  -     Follow Anesthesia Guidelines / Standing Orders; Future  -     Obtain Informed Consent; Future        ESOPHAGOGASTRODUODENOSCOPY (N/A)     Diagnosis Plan   1. Gastroesophageal reflux disease, unspecified whether esophagitis present  Case Request    Case Request       Anticipated Surgical Procedure:  Orders Placed This Encounter   Procedures   • Follow Anesthesia Guidelines / Standing Orders     Standing Status:   Future   • Obtain Informed Consent     Standing Status:   Future     Order Specific Question:   Informed Consent Given For     Answer:   ESOPHAGOGASTRODUODENOSCOPY       The risks, benefits,  and alternatives of this procedure have been discussed with the patient or the responsible party- the patient understands and agrees to proceed.

## 2022-06-03 ENCOUNTER — ANESTHESIA EVENT (OUTPATIENT)
Dept: GASTROENTEROLOGY | Facility: HOSPITAL | Age: 62
End: 2022-06-03

## 2022-06-03 ENCOUNTER — HOSPITAL ENCOUNTER (OUTPATIENT)
Facility: HOSPITAL | Age: 62
Setting detail: HOSPITAL OUTPATIENT SURGERY
Discharge: HOME | End: 2022-06-03
Attending: INTERNAL MEDICINE | Admitting: INTERNAL MEDICINE

## 2022-06-03 ENCOUNTER — ANESTHESIA (OUTPATIENT)
Dept: GASTROENTEROLOGY | Facility: HOSPITAL | Age: 62
End: 2022-06-03

## 2022-06-03 VITALS
HEART RATE: 86 BPM | TEMPERATURE: 97.7 F | SYSTOLIC BLOOD PRESSURE: 106 MMHG | HEIGHT: 61 IN | DIASTOLIC BLOOD PRESSURE: 72 MMHG | OXYGEN SATURATION: 95 % | RESPIRATION RATE: 20 BRPM | WEIGHT: 171 LBS | BODY MASS INDEX: 32.28 KG/M2

## 2022-06-03 DIAGNOSIS — K21.9 GASTROESOPHAGEAL REFLUX DISEASE, UNSPECIFIED WHETHER ESOPHAGITIS PRESENT: ICD-10-CM

## 2022-06-03 PROCEDURE — 43239 EGD BIOPSY SINGLE/MULTIPLE: CPT | Performed by: INTERNAL MEDICINE

## 2022-06-03 PROCEDURE — 25010000002 PROPOFOL 10 MG/ML EMULSION

## 2022-06-03 RX ORDER — LIDOCAINE HYDROCHLORIDE 20 MG/ML
INJECTION, SOLUTION INTRAVENOUS AS NEEDED
Status: DISCONTINUED | OUTPATIENT
Start: 2022-06-03 | End: 2022-06-03 | Stop reason: SURG

## 2022-06-03 RX ORDER — PROPOFOL 10 MG/ML
VIAL (ML) INTRAVENOUS AS NEEDED
Status: DISCONTINUED | OUTPATIENT
Start: 2022-06-03 | End: 2022-06-03 | Stop reason: SURG

## 2022-06-03 RX ORDER — DEXTROSE AND SODIUM CHLORIDE 5; .45 G/100ML; G/100ML
30 INJECTION, SOLUTION INTRAVENOUS CONTINUOUS PRN
Status: DISCONTINUED | OUTPATIENT
Start: 2022-06-03 | End: 2022-06-03 | Stop reason: HOSPADM

## 2022-06-03 RX ADMIN — LIDOCAINE HYDROCHLORIDE 80 MG: 20 INJECTION, SOLUTION INTRAVENOUS at 11:30

## 2022-06-03 RX ADMIN — PROPOFOL 40 MG: 10 INJECTION, EMULSION INTRAVENOUS at 11:32

## 2022-06-03 RX ADMIN — PROPOFOL 80 MG: 10 INJECTION, EMULSION INTRAVENOUS at 11:30

## 2022-06-03 RX ADMIN — PROPOFOL 40 MG: 10 INJECTION, EMULSION INTRAVENOUS at 11:31

## 2022-06-03 RX ADMIN — DEXTROSE AND SODIUM CHLORIDE 30 ML/HR: 5; 450 INJECTION, SOLUTION INTRAVENOUS at 11:00

## 2022-06-03 NOTE — ANESTHESIA POSTPROCEDURE EVALUATION
Patient: Jarvis Deleon    Procedure Summary     Date: 06/03/22 Room / Location: [facility] ENDOSCOPY 3 / [facility]OSCOPY    Anesthesia Start: 1127 Anesthesia Stop: 1133    Procedure: ESOPHAGOGASTRODUODENOSCOPY (N/A ) Diagnosis:       Gastroesophageal reflux disease, unspecified whether esophagitis present      (Gastroesophageal reflux disease, unspecified whether esophagitis present [K21.9])    Surgeons: Jt Quintanilla MD Provider: Nilsa Ray CRNA    Anesthesia Type: MAC ASA Status: 3          Anesthesia Type: MAC    Vitals  No vitals data found for the desired time range.          Post Anesthesia Care and Evaluation    Patient location during evaluation: bedside  Patient participation: waiting for patient participation  Level of consciousness: responsive to verbal stimuli  Pain management: adequate  Airway patency: patent  Anesthetic complications: No anesthetic complications  PONV Status: none  Cardiovascular status: acceptable  Respiratory status: acceptable  Hydration status: acceptable    Comments: ---------------------------               06/03/22                      [facility]         ---------------------------   BP:          117/90         Pulse:         95           Resp:          18           Temp:   98.2 °F (36.8 °C)   SpO2:          96%         ---------------------------

## 2022-06-03 NOTE — ANESTHESIA PREPROCEDURE EVALUATION
Anesthesia Evaluation     Patient summary reviewed and Nursing notes reviewed   NPO Solid Status: > 8 hours  NPO Liquid Status: > 2 hours           Airway   Mallampati: II  TM distance: >3 FB  Neck ROM: full  No difficulty expected  Dental - normal exam     Pulmonary - normal exam   (+) asthma,  Cardiovascular     Rhythm: regular  Rate: normal    (+) hyperlipidemia,       Neuro/Psych  GI/Hepatic/Renal/Endo    (+) obesity,  GERD poorly controlled,      Musculoskeletal     Abdominal   (+) obese,    Substance History      OB/GYN          Other                        Anesthesia Plan    ASA 3     MAC       Anesthetic plan, all risks, benefits, and alternatives have been provided, discussed and informed consent has been obtained with: patient.        CODE STATUS:

## 2022-06-06 LAB — REF LAB TEST METHOD: NORMAL

## 2022-06-14 NOTE — H&P
Past Medical History:   Diagnosis Date   • Smoking hx      Past Medical History:   Diagnosis Date   • Smoking hx

## 2022-06-17 ENCOUNTER — OFFICE VISIT (OUTPATIENT)
Dept: GASTROENTEROLOGY | Facility: CLINIC | Age: 62
End: 2022-06-17

## 2022-06-17 VITALS — HEIGHT: 61 IN | BODY MASS INDEX: 32.28 KG/M2 | WEIGHT: 171 LBS

## 2022-06-17 DIAGNOSIS — K21.9 GASTROESOPHAGEAL REFLUX DISEASE, UNSPECIFIED WHETHER ESOPHAGITIS PRESENT: Primary | ICD-10-CM

## 2022-06-17 DIAGNOSIS — K44.9 HIATAL HERNIA: ICD-10-CM

## 2022-06-17 PROCEDURE — 99214 OFFICE O/P EST MOD 30 MIN: CPT | Performed by: INTERNAL MEDICINE

## 2022-06-17 RX ORDER — TRIAMCINOLONE ACETONIDE 1 MG/G
1 CREAM TOPICAL AS NEEDED
COMMUNITY
Start: 2022-05-26

## 2022-06-17 RX ORDER — SUCRALFATE 1 G/1
1 TABLET ORAL 4 TIMES DAILY
Qty: 120 TABLET | Refills: 5 | Status: SHIPPED | OUTPATIENT
Start: 2022-06-17

## 2022-06-17 NOTE — PROGRESS NOTES
[facility]      Chief Complaint:   Chief Complaint   Patient presents with   • EGD Performed 06/03/2022     Gastroesophageal Reflux Disease       Subjective     HPI:   Patient with severe GERD.  Patient states that despite taking proton pump inhibitor she still wakes up choking at night.  EGD shows patient to have a large hiatal hernia and esophagitis as well as gastritis.    Plan; start patient on Carafate 1 g 4 times daily.  Discussed with patient the importance of weight loss over the next 3 months.  If this not does not improve symptoms patient may need surgical evaluation for hiatal hernia repair    Past Medical History:   Past Medical History:   Diagnosis Date   • Smoking hx        Past Surgical History:    Past Surgical History:   Procedure Laterality Date   • BLADDER SURGERY      TVT (Tension Free Vaginal Tape) TOT (Transobturator Tape); 2006/2008   • ENDOSCOPY N/A 6/3/2022    Procedure: ESOPHAGOGASTRODUODENOSCOPY;  Surgeon: Jt Quintanilla MD;  Location: [facility] ENDOSCOPY;  Service: Gastroenterology;  Laterality: N/A;   • HYSTERECTOMY  2007   • SINUS SURGERY  09/2018   • TOTAL HIP ARTHROPLASTY Left 07/2016       Family History:  Family History   Problem Relation Age of Onset   • Heart disease Mother    • Diabetes Mother    • Other Father         Covid 19   • Diabetes Brother    • Heart disease Brother    • Kidney disease Brother        Social History:   reports that she has never smoked. She has never used smokeless tobacco. She reports that she does not drink alcohol and does not use drugs.    Medications:   Prior to Admission medications    Medication Sig Start Date End Date Taking? Authorizing Provider   albuterol (PROVENTIL) (2.5 MG/3ML) 0.083% nebulizer solution Inhale 2.5 mg Every 4 (Four) Hours As Needed. 12/30/21  Yes Jae Stevenson MD   albuterol sulfate  (90 Base) MCG/ACT inhaler Inhale 2 puffs Every 4 (Four) Hours As Needed. 1/4/22  Yes Jae Stevenson  MD   ascorbic acid (VITAMIN C) 1000 MG tablet Take 1,000 mg by mouth Daily.   Yes Jae Stevenson MD   Cholecalciferol (VITAMIN D3 PO) Take 1 tablet by mouth Daily.   Yes Jae Stevenson MD   cyanocobalamin (VITAMIN B-12) 1000 MCG tablet Take 1,000 mcg by mouth Daily.   Yes aJe Stevenson MD   omeprazole (priLOSEC) 40 MG capsule Take 40 mg by mouth Daily.   Yes Jae Stevenson MD   ondansetron ODT (ZOFRAN-ODT) 4 MG disintegrating tablet Take 4 mg by mouth Every 8 (Eight) Hours As Needed for Nausea or Vomiting. 2/2/22  Yes Jae Stevenson MD   PHYTOSTEROLS PO Take 4 capsules by mouth Daily.   Yes Jae Stevenson MD   promethazine-dextromethorphan (PROMETHAZINE-DM) 6.25-15 MG/5ML syrup Take 5 mL by mouth 4 (Four) Times a Day As Needed for Cough. 2/2/22  Yes Provider, Historical, MD   rosuvastatin (CRESTOR) 5 MG tablet Take 5 mg by mouth Every Night. 1/3/22  Yes Jae Stevenson MD   triamcinolone (KENALOG) 0.1 % cream Apply 1 application topically to the appropriate area as directed As Needed. 5/26/22  Yes Jae Stevenson MD   Turmeric 500 MG capsule Take 1 capsule by mouth Daily.   Yes Jae Stevenson MD   Zinc 50 MG tablet Take 50 mg by mouth Daily.   Yes Jae Stevenson MD   sertraline (ZOLOFT) 100 MG tablet Take 100 mg by mouth Every Night. 11/4/20 2/3/21  Jae Stevenson MD   sucralfate (CARAFATE) 1 g tablet Take 1 tablet by mouth 4 (Four) Times a Day. 6/17/22   Jt Quintanilla MD       Allergies:  Keflex [cephalexin]    ROS:    Review of Systems   Constitutional: Negative for activity change, appetite change, chills, diaphoresis, fatigue, fever and unexpected weight change.   HENT: Negative for sore throat and trouble swallowing.    Respiratory: Negative for shortness of breath.    Gastrointestinal: Negative for abdominal distention, abdominal pain, anal bleeding, blood in stool, constipation, diarrhea, nausea, rectal pain and vomiting.  "  Endocrine: Negative for polydipsia, polyphagia and polyuria.   Genitourinary: Negative for difficulty urinating.   Musculoskeletal: Negative for arthralgias.   Skin: Negative for pallor.   Allergic/Immunologic: Negative for food allergies.   Neurological: Negative for weakness and light-headedness.   Psychiatric/Behavioral: Negative for behavioral problems.     Objective     Height 154.9 cm (61\"), weight 77.6 kg (171 lb).    Physical Exam  Constitutional:       General: She is not in acute distress.     Appearance: She is well-developed. She is not diaphoretic.   HENT:      Head: Normocephalic and atraumatic.   Cardiovascular:      Rate and Rhythm: Normal rate and regular rhythm.      Heart sounds: Normal heart sounds. No murmur heard.    No friction rub. No gallop.   Pulmonary:      Effort: No respiratory distress.      Breath sounds: Normal breath sounds. No wheezing or rales.   Chest:      Chest wall: No tenderness.   Abdominal:      General: Bowel sounds are normal. There is no distension.      Palpations: Abdomen is soft. There is no mass.      Tenderness: There is no abdominal tenderness. There is no guarding or rebound.      Hernia: No hernia is present.   Musculoskeletal:         General: Normal range of motion.   Skin:     General: Skin is warm and dry.      Coloration: Skin is not pale.      Findings: No erythema or rash.   Neurological:      Mental Status: She is alert and oriented to person, place, and time.   Psychiatric:         Behavior: Behavior normal.         Thought Content: Thought content normal.         Judgment: Judgment normal.          Assessment & Plan   Diagnoses and all orders for this visit:    1. Gastroesophageal reflux disease, unspecified whether esophagitis present (Primary)    2. Hiatal hernia    Other orders  -     sucralfate (CARAFATE) 1 g tablet; Take 1 tablet by mouth 4 (Four) Times a Day.  Dispense: 120 tablet; Refill: 5        * Surgery not found *     Diagnosis Plan   1. " Gastroesophageal reflux disease, unspecified whether esophagitis present     2. Hiatal hernia         Anticipated Surgical Procedure:  No orders of the defined types were placed in this encounter.      The risks, benefits, and alternatives of this procedure have been discussed with the patient or the responsible party- the patient understands and agrees to proceed.

## 2022-09-22 ENCOUNTER — OFFICE VISIT (OUTPATIENT)
Dept: GASTROENTEROLOGY | Facility: CLINIC | Age: 62
End: 2022-09-22

## 2022-09-22 VITALS
HEIGHT: 61 IN | HEART RATE: 107 BPM | SYSTOLIC BLOOD PRESSURE: 104 MMHG | WEIGHT: 169 LBS | DIASTOLIC BLOOD PRESSURE: 62 MMHG | BODY MASS INDEX: 31.91 KG/M2

## 2022-09-22 DIAGNOSIS — K21.9 GASTROESOPHAGEAL REFLUX DISEASE, UNSPECIFIED WHETHER ESOPHAGITIS PRESENT: Primary | ICD-10-CM

## 2022-09-22 DIAGNOSIS — K44.9 HIATAL HERNIA: ICD-10-CM

## 2022-09-22 PROCEDURE — 99214 OFFICE O/P EST MOD 30 MIN: CPT | Performed by: INTERNAL MEDICINE

## 2022-09-22 NOTE — PROGRESS NOTES
[facility]      Chief Complaint:   Chief Complaint   Patient presents with   • Follow-up       Subjective     HPI:   Patient returns for 3-month follow-up due to severe GERD and small hiatal hernia.  At her last visit discussed the possibility of surgery but patient wanted to try Carafate first.  Patient reports today that she is doing well taking Prilosec 40 mg every morning and Carafate twice daily.  She is in the process of moving and would like to delay any discussion of surgery at this time.    Plan: Patient will continue with current medications as previously prescribed.  Patient states she could not tolerate Pepcid due to side effects.  Patient will return in 3 months for follow-up or sooner for any problems.  We will revisit possible TIF procedure at that time.    Past Medical History:   Past Medical History:   Diagnosis Date   • Smoking hx        Past Surgical History:    Past Surgical History:   Procedure Laterality Date   • BLADDER SURGERY      TVT (Tension Free Vaginal Tape) TOT (Transobturator Tape); 2006/2008   • ENDOSCOPY N/A 6/3/2022    Procedure: ESOPHAGOGASTRODUODENOSCOPY;  Surgeon: Jt Quintanilla MD;  Location: [facility] ENDOSCOPY;  Service: Gastroenterology;  Laterality: N/A;   • HYSTERECTOMY  2007   • SINUS SURGERY  09/2018   • TOTAL HIP ARTHROPLASTY Left 07/2016       Family History:  Family History   Problem Relation Age of Onset   • Heart disease Mother    • Diabetes Mother    • Other Father         Covid 19   • Diabetes Brother    • Heart disease Brother    • Kidney disease Brother        Social History:   reports that she has never smoked. She has never used smokeless tobacco. She reports that she does not drink alcohol and does not use drugs.    Medications:   Prior to Admission medications    Medication Sig Start Date End Date Taking? Authorizing Provider   albuterol (PROVENTIL) (2.5 MG/3ML) 0.083% nebulizer solution Inhale 2.5 mg Every 4 (Four) Hours As Needed.  12/30/21  Yes Jae Stevenson MD   albuterol sulfate  (90 Base) MCG/ACT inhaler Inhale 2 puffs Every 4 (Four) Hours As Needed. 1/4/22  Yes Jae Stevenson MD   ascorbic acid (VITAMIN C) 1000 MG tablet Take 1,000 mg by mouth Daily.   Yes Jae Stevenson MD   Cholecalciferol (VITAMIN D3 PO) Take 1 tablet by mouth Daily.   Yes Jae Stevenson MD   cyanocobalamin (VITAMIN B-12) 1000 MCG tablet Take 1,000 mcg by mouth Daily.   Yes Jae Stevenson MD   omeprazole (priLOSEC) 40 MG capsule Take 40 mg by mouth Daily.   Yes Jae Stevenson MD   ondansetron ODT (ZOFRAN-ODT) 4 MG disintegrating tablet Take 4 mg by mouth Every 8 (Eight) Hours As Needed for Nausea or Vomiting. 2/2/22  Yes Jae Stevenson MD   PHYTOSTEROLS PO Take 4 capsules by mouth Daily.   Yes Jae Stevenson MD   promethazine-dextromethorphan (PROMETHAZINE-DM) 6.25-15 MG/5ML syrup Take 5 mL by mouth 4 (Four) Times a Day As Needed for Cough. 2/2/22  Yes Jae Stevenson MD   rosuvastatin (CRESTOR) 5 MG tablet Take 5 mg by mouth Every Night. 1/3/22  Yes Jae Stevenson MD   sucralfate (CARAFATE) 1 g tablet Take 1 tablet by mouth 4 (Four) Times a Day. 6/17/22  Yes Jt Quintanilla MD   triamcinolone (KENALOG) 0.1 % cream Apply 1 application topically to the appropriate area as directed As Needed. 5/26/22  Yes Jae Stevenson MD   Turmeric 500 MG capsule Take 1 capsule by mouth Daily.   Yes Jae Stevenson MD   Zinc 50 MG tablet Take 50 mg by mouth Daily.   Yes Jae Stevenson MD   sertraline (ZOLOFT) 100 MG tablet Take 100 mg by mouth Every Night. 11/4/20 2/3/21  Jae Stevenson MD       Allergies:  Keflex [cephalexin] and Propofol    ROS:    Review of Systems   Constitutional: Negative for activity change, appetite change, chills, diaphoresis, fatigue, fever and unexpected weight change.   HENT: Negative for sore throat and trouble swallowing.    Respiratory: Negative for  "shortness of breath.    Gastrointestinal: Negative.  Negative for abdominal distention, abdominal pain, anal bleeding, blood in stool, constipation, diarrhea, nausea, rectal pain and vomiting.   Endocrine: Negative for polydipsia, polyphagia and polyuria.   Genitourinary: Negative for difficulty urinating.   Musculoskeletal: Negative for arthralgias.   Skin: Negative for pallor.   Allergic/Immunologic: Negative for food allergies.   Neurological: Negative for weakness and light-headedness.   Psychiatric/Behavioral: Negative for behavioral problems.     Objective     Blood pressure 104/62, pulse 107, height 154.9 cm (61\"), weight 76.7 kg (169 lb).    Physical Exam  Constitutional:       General: She is not in acute distress.     Appearance: She is well-developed. She is not diaphoretic.   HENT:      Head: Normocephalic and atraumatic.   Cardiovascular:      Rate and Rhythm: Normal rate and regular rhythm.      Heart sounds: Normal heart sounds. No murmur heard.    No friction rub. No gallop.   Pulmonary:      Effort: No respiratory distress.      Breath sounds: Normal breath sounds. No wheezing or rales.   Chest:      Chest wall: No tenderness.   Abdominal:      General: Bowel sounds are normal. There is no distension.      Palpations: Abdomen is soft. There is no mass.      Tenderness: There is abdominal tenderness in the epigastric area. There is no guarding or rebound.      Hernia: No hernia is present.   Musculoskeletal:         General: Normal range of motion.   Skin:     General: Skin is warm and dry.      Coloration: Skin is not pale.      Findings: No erythema or rash.   Neurological:      Mental Status: She is alert and oriented to person, place, and time.   Psychiatric:         Behavior: Behavior normal.         Thought Content: Thought content normal.         Judgment: Judgment normal.          Assessment & Plan   Diagnoses and all orders for this visit:    1. Gastroesophageal reflux disease, unspecified " whether esophagitis present (Primary)    2. Hiatal hernia        * Surgery not found *     Diagnosis Plan   1. Gastroesophageal reflux disease, unspecified whether esophagitis present     2. Hiatal hernia         Anticipated Surgical Procedure:  No orders of the defined types were placed in this encounter.      The risks, benefits, and alternatives of this procedure have been discussed with the patient or the responsible party- the patient understands and agrees to proceed.

## 2023-01-05 ENCOUNTER — OFFICE VISIT (OUTPATIENT)
Dept: GASTROENTEROLOGY | Facility: CLINIC | Age: 63
End: 2023-01-05
Payer: COMMERCIAL

## 2023-01-05 VITALS
DIASTOLIC BLOOD PRESSURE: 90 MMHG | SYSTOLIC BLOOD PRESSURE: 142 MMHG | WEIGHT: 168.2 LBS | HEART RATE: 111 BPM | BODY MASS INDEX: 31.75 KG/M2 | HEIGHT: 61 IN

## 2023-01-05 DIAGNOSIS — K44.9 HIATAL HERNIA: ICD-10-CM

## 2023-01-05 DIAGNOSIS — K21.9 GASTROESOPHAGEAL REFLUX DISEASE, UNSPECIFIED WHETHER ESOPHAGITIS PRESENT: Primary | ICD-10-CM

## 2023-01-05 PROCEDURE — 99213 OFFICE O/P EST LOW 20 MIN: CPT | Performed by: INTERNAL MEDICINE

## (undated) DEVICE — SINGLE-USE BIOPSY FORCEPS: Brand: RADIAL JAW 4

## (undated) DEVICE — BITEBLOCK ENDO W/STRAP 60F A/ LF DISP